# Patient Record
Sex: FEMALE | Race: WHITE | Employment: OTHER | URBAN - METROPOLITAN AREA
[De-identification: names, ages, dates, MRNs, and addresses within clinical notes are randomized per-mention and may not be internally consistent; named-entity substitution may affect disease eponyms.]

---

## 2017-04-01 ENCOUNTER — HOSPITAL ENCOUNTER (EMERGENCY)
Age: 47
Discharge: HOME | End: 2017-04-01
Attending: EMERGENCY MEDICINE | Admitting: EMERGENCY MEDICINE
Payer: COMMERCIAL

## 2017-04-01 ENCOUNTER — APPOINTMENT (OUTPATIENT)
Dept: GENERAL RADIOLOGY | Age: 47
End: 2017-04-01
Attending: EMERGENCY MEDICINE
Payer: COMMERCIAL

## 2017-04-01 VITALS
HEIGHT: 67 IN | WEIGHT: 215 LBS | HEART RATE: 147 BPM | OXYGEN SATURATION: 94 % | DIASTOLIC BLOOD PRESSURE: 72 MMHG | SYSTOLIC BLOOD PRESSURE: 119 MMHG | TEMPERATURE: 98.8 F | BODY MASS INDEX: 33.74 KG/M2 | RESPIRATION RATE: 20 BRPM

## 2017-04-01 DIAGNOSIS — J45.901 RAD (REACTIVE AIRWAY DISEASE), UNSPECIFIED ASTHMA SEVERITY, WITH ACUTE EXACERBATION: Primary | ICD-10-CM

## 2017-04-01 LAB
ANION GAP BLD CALC-SCNC: 11 MMOL/L (ref 5–15)
BASOPHILS # BLD AUTO: 0 K/UL (ref 0–0.1)
BASOPHILS # BLD: 0 % (ref 0–1)
BUN SERPL-MCNC: 10 MG/DL (ref 6–20)
BUN/CREAT SERPL: 13 (ref 12–20)
CALCIUM SERPL-MCNC: 9.1 MG/DL (ref 8.5–10.1)
CHLORIDE SERPL-SCNC: 102 MMOL/L (ref 97–108)
CO2 SERPL-SCNC: 25 MMOL/L (ref 21–32)
CREAT SERPL-MCNC: 0.78 MG/DL (ref 0.55–1.02)
DIFFERENTIAL METHOD BLD: ABNORMAL
EOSINOPHIL # BLD: 0 K/UL (ref 0–0.4)
EOSINOPHIL NFR BLD: 0 % (ref 0–7)
ERYTHROCYTE [DISTWIDTH] IN BLOOD BY AUTOMATED COUNT: 13.2 % (ref 11.5–14.5)
GLUCOSE SERPL-MCNC: 153 MG/DL (ref 65–100)
HCT VFR BLD AUTO: 43 % (ref 35–47)
HGB BLD-MCNC: 14.2 G/DL (ref 11.5–16)
LYMPHOCYTES # BLD AUTO: 6 % (ref 12–49)
LYMPHOCYTES # BLD: 0.6 K/UL (ref 0.8–3.5)
MCH RBC QN AUTO: 28.3 PG (ref 26–34)
MCHC RBC AUTO-ENTMCNC: 33 G/DL (ref 30–36.5)
MCV RBC AUTO: 85.8 FL (ref 80–99)
MONOCYTES # BLD: 0.4 K/UL (ref 0–1)
MONOCYTES NFR BLD AUTO: 4 % (ref 5–13)
NEUTS SEG # BLD: 8.3 K/UL (ref 1.8–8)
NEUTS SEG NFR BLD AUTO: 90 % (ref 32–75)
PLATELET # BLD AUTO: 329 K/UL (ref 150–400)
POTASSIUM SERPL-SCNC: 4 MMOL/L (ref 3.5–5.1)
RBC # BLD AUTO: 5.01 M/UL (ref 3.8–5.2)
RBC MORPH BLD: ABNORMAL
SODIUM SERPL-SCNC: 138 MMOL/L (ref 136–145)
TROPONIN I SERPL-MCNC: <0.04 NG/ML
TROPONIN I SERPL-MCNC: <0.04 NG/ML
WBC # BLD AUTO: 9.3 K/UL (ref 3.6–11)

## 2017-04-01 PROCEDURE — 94640 AIRWAY INHALATION TREATMENT: CPT

## 2017-04-01 PROCEDURE — 74011000250 HC RX REV CODE- 250: Performed by: EMERGENCY MEDICINE

## 2017-04-01 PROCEDURE — 96365 THER/PROPH/DIAG IV INF INIT: CPT

## 2017-04-01 PROCEDURE — 84484 ASSAY OF TROPONIN QUANT: CPT | Performed by: EMERGENCY MEDICINE

## 2017-04-01 PROCEDURE — 77030013140 HC MSK NEB VYRM -A

## 2017-04-01 PROCEDURE — 93005 ELECTROCARDIOGRAM TRACING: CPT

## 2017-04-01 PROCEDURE — 80048 BASIC METABOLIC PNL TOTAL CA: CPT | Performed by: EMERGENCY MEDICINE

## 2017-04-01 PROCEDURE — 36415 COLL VENOUS BLD VENIPUNCTURE: CPT | Performed by: EMERGENCY MEDICINE

## 2017-04-01 PROCEDURE — 85025 COMPLETE CBC W/AUTO DIFF WBC: CPT | Performed by: EMERGENCY MEDICINE

## 2017-04-01 PROCEDURE — 74011250636 HC RX REV CODE- 250/636: Performed by: EMERGENCY MEDICINE

## 2017-04-01 PROCEDURE — 99284 EMERGENCY DEPT VISIT MOD MDM: CPT

## 2017-04-01 PROCEDURE — 71020 XR CHEST PA LAT: CPT

## 2017-04-01 PROCEDURE — 77030018744 HC FLOMTR PEAK FLO -A

## 2017-04-01 RX ORDER — MAGNESIUM SULFATE HEPTAHYDRATE 40 MG/ML
2 INJECTION, SOLUTION INTRAVENOUS ONCE
Status: COMPLETED | OUTPATIENT
Start: 2017-04-01 | End: 2017-04-01

## 2017-04-01 RX ORDER — DOXYCYCLINE 100 MG/1
100 CAPSULE ORAL 2 TIMES DAILY
COMMUNITY

## 2017-04-01 RX ORDER — ALBUTEROL SULFATE 90 UG/1
2 AEROSOL, METERED RESPIRATORY (INHALATION)
Qty: 1 INHALER | Refills: 0 | Status: SHIPPED | OUTPATIENT
Start: 2017-04-01

## 2017-04-01 RX ADMIN — ALBUTEROL SULFATE 1 DOSE: 2.5 SOLUTION RESPIRATORY (INHALATION) at 11:06

## 2017-04-01 RX ADMIN — ALBUTEROL SULFATE 1 DOSE: 2.5 SOLUTION RESPIRATORY (INHALATION) at 11:21

## 2017-04-01 RX ADMIN — ALBUTEROL SULFATE 1 DOSE: 2.5 SOLUTION RESPIRATORY (INHALATION) at 11:36

## 2017-04-01 RX ADMIN — MAGNESIUM SULFATE IN WATER 2 G: 40 INJECTION, SOLUTION INTRAVENOUS at 13:00

## 2017-04-01 NOTE — DISCHARGE INSTRUCTIONS
We hope that we have addressed all of your medical concerns. The examination and treatment you received in the Emergency Department were for an emergent problem and were not intended as complete care. It is important that you follow up with your healthcare provider(s) for ongoing care. If your symptoms worsen or do not improve as expected, and you are unable to reach your usual health care provider(s), you should return to the Emergency Department. Today's healthcare is undergoing tremendous change, and patient satisfaction surveys are one of the many tools to assess the quality of medical care. You may receive a survey from the [employer] regarding your experience in the Emergency Department. I hope that your experience has been completely positive, particularly the medical care that I provided. As such, please participate in the survey; anything less than excellent does not meet my expectations or intentions. [facility] and 8 [facility]3] participate in nationally recognized quality of care measures. If your blood pressure is greater than 120/80, as reported below, we urge that you seek medical care to address the potential of high blood pressure, commonly known as hypertension. Hypertension can be hereditary or can be caused by certain medical conditions, pain, stress, or \"white coat syndrome. \"       Please make an appointment with your health care provider(s) for follow up of your Emergency Department visit.        VITALS:   Patient Vitals for the past 8 hrs:   Temp Pulse Resp BP SpO2   04/01/17 1430 - - - 110/58 93 %   04/01/17 1400 - - - 120/60 95 %   04/01/17 1352 - (!) 147 - - 98 %   04/01/17 1352 - - - 122/68 100 %   04/01/17 1330 - - - 94/53 96 %   04/01/17 1321 - - - 119/76 -   04/01/17 1300 - - - 118/57 95 %   04/01/17 1245 - - - 122/77 98 %   04/01/17 1230 - - - 118/71 99 %   04/01/17 1228 - - - 115/67 96 %   04/01/17 1200 - - - 132/77 98 %   04/01/17 1146 - - - 146/84 99 %   04/01/17 1059 - - - - 96 %   04/01/17 1047 98.8 °F (37.1 °C) (!) 122 20 140/82 96 %          Thank you for allowing us to provide you with medical care today. We realize that you have many choices for your emergency care needs. Please choose us in the future for any continued health care needs. Regards,           Elisabeth Dai DO    [facility] [address] [place] [address].   Office: [phone]            Recent Results (from the past 24 hour(s))   EKG, 12 LEAD, INITIAL    Collection Time: 04/01/17 10:55 AM   Result Value Ref Range    Ventricular Rate 119 BPM    Atrial Rate 119 BPM    P-R Interval 150 ms    QRS Duration 86 ms    Q-T Interval 310 ms    QTC Calculation (Bezet) 436 ms    Calculated P Axis 53 degrees    Calculated R Axis 10 degrees    Calculated T Axis 91 degrees    Diagnosis       Sinus tachycardia  Nonspecific T wave abnormality  Abnormal ECG  No previous ECGs available     CBC WITH AUTOMATED DIFF    Collection Time: 04/01/17 11:16 AM   Result Value Ref Range    WBC 9.3 3.6 - 11.0 K/uL    RBC 5.01 3.80 - 5.20 M/uL    HGB 14.2 11.5 - 16.0 g/dL    HCT 43.0 35.0 - 47.0 %    MCV 85.8 80.0 - 99.0 FL    MCH 28.3 26.0 - 34.0 PG    MCHC 33.0 30.0 - 36.5 g/dL    RDW 13.2 11.5 - 14.5 %    PLATELET 722 278 - 749 K/uL    NEUTROPHILS 90 (H) 32 - 75 %    LYMPHOCYTES 6 (L) 12 - 49 %    MONOCYTES 4 (L) 5 - 13 %    EOSINOPHILS 0 0 - 7 %    BASOPHILS 0 0 - 1 %    ABS. NEUTROPHILS 8.3 (H) 1.8 - 8.0 K/UL    ABS. LYMPHOCYTES 0.6 (L) 0.8 - 3.5 K/UL    ABS. MONOCYTES 0.4 0.0 - 1.0 K/UL    ABS. EOSINOPHILS 0.0 0.0 - 0.4 K/UL    ABS.  BASOPHILS 0.0 0.0 - 0.1 K/UL    DF SMEAR SCANNED      RBC COMMENTS NORMOCYTIC, NORMOCHROMIC     METABOLIC PANEL, BASIC    Collection Time: 04/01/17 11:16 AM   Result Value Ref Range    Sodium 138 136 - 145 mmol/L    Potassium 4.0 3.5 - 5.1 mmol/L    Chloride 102 97 - 108 mmol/L    CO2 25 21 - 32 mmol/L    Anion gap 11 5 - 15 mmol/L    Glucose 153 (H) 65 - 100 mg/dL    BUN 10 6 - 20 MG/DL    Creatinine 0.78 0.55 - 1.02 MG/DL    BUN/Creatinine ratio 13 12 - 20      GFR est AA >60 >60 ml/min/1.73m2    GFR est non-AA >60 >60 ml/min/1.73m2    Calcium 9.1 8.5 - 10.1 MG/DL   TROPONIN I    Collection Time: 04/01/17 11:16 AM   Result Value Ref Range    Troponin-I, Qt. <0.04 <0.05 ng/mL       Xr Chest Pa Lat    Result Date: 4/1/2017  INDICATION: . cough, Additional history: Bronchitis diagnosed 2 days previously. Patient placed on inhaler, prednisone and doxycycline. Increasing symptoms. COMPARISON: None. Lesvia Line FINDINGS: PA and lateral view of the chest. . Lines/tubes/surgical: None. Heart/mediastinum: Unremarkable. Lungs/pleura:  No focal consolidation or mass. No visualized pleural effusion or pneumothorax. Additional Comments: None. Lesvia Line IMPRESSION: 1. No radiographic evidence of acute cardiopulmonary disease. Reactive Airway Disease: Care Instructions  Your Care Instructions  Reactive airway disease is a breathing problem that appears as wheezing, a whistling noise in your airways. It may be caused by a viral or bacterial infection, allergies, tobacco smoke, or something else in the environment. When you are around these triggers, your body releases chemicals that make the airways get tight. Reactive airway disease is a lot like asthma. Both can cause wheezing. But asthma is ongoing, while reactive airway disease may occur only now and then. Tests can be done to tell whether you have asthma. You may take the same medicines used to treat asthma. Good home care and follow-up care with your doctor can help you recover. Follow-up care is a key part of your treatment and safety. Be sure to make and go to all appointments, and call your doctor if you are having problems. It's also a good idea to know your test results and keep a list of the medicines you take. How can you care for yourself at home? · Take your medicines exactly as prescribed.  Call your doctor if you think you are having a problem with your medicine. · Do not smoke or allow others to smoke around you. If you need help quitting, talk to your doctor about stop-smoking programs and medicines. These can increase your chances of quitting for good. · If you know what caused your wheezing (such as perfume or the odor of household chemicals), try to avoid it in the future. · Wash your hands several times a day, and consider using hand gels or wipes that contain alcohol. This can prevent colds and other infections. When should you call for help? Call 911 anytime you think you may need emergency care. For example, call if:  · You have severe trouble breathing. Watch closely for changes in your health, and be sure to contact your doctor if:  · You cough up yellow, dark brown, or bloody mucus. · You have a fever. · Your wheezing gets worse. Where can you learn more? Go to http://marce-latha.info/. Enter L039 in the search box to learn more about \"Reactive Airway Disease: Care Instructions. \"  Current as of: May 23, 2016  Content Version: 11.2  © 0913-8733 [employer]. Care instructions adapted under license by [employer] (which disclaims liability or warranty for this information). If you have questions about a medical condition or this instruction, always ask your healthcare professional. [address] any warranty or liability for your use of this information.

## 2017-04-01 NOTE — ED TRIAGE NOTES
Pt states she was seen at [facility] on Thursday for bronchitis and was placed on inhaler, Prednisone, and Doxy. Pt reports it has moved into her chest rapidly.

## 2017-04-01 NOTE — ED NOTES
Patient ambulated around nurses station without complaints of dizziness or shortness of breath. Pulse up to 147, saturations on room air %.

## 2017-04-01 NOTE — ED PROVIDER NOTES
HPI Comments: 51 y.o. female with past medical history significant for HTN, elevated lipids, pre-diabetes, temporal lobe epilepsy, complicated migraine, vertigo, AI disease, recurrent sinusitis, bronchitis, and PNA who presents from home with chief complaint of CP. Pt reports to the ED c/o CP with accompanying tightness that began within the past week with accompanying cough, fever, diaphoresis, and dizziness. Pt states that she has experienced a similar CP/tightness in the past, but that it was not as severe. Pt was seen at [facility] 2 days ago for a sinus infection, that she states is chronic, and she was prescribed a Prednisone taper, BID Doxycycline, and an inhaler. Pt states that the Prednisone did not seem to improve her symptoms, but the steam from a shower did seem to help. Pt reports that she also irrigates her sinuses regularly. Pt notes that her cough is productive of yellowish green sputum. Pt notes that she has Hx of seizure-like activity, for which she takes Topamax. Pt reports that she has an aura of \"vikas vu\" that usually precedes her episodes of seizure-like activity, and she experienced the aura this morning. Pt has taken the first 2 days of Prednisone, most recently this morning, and the first 3 doses of Doxycycline, most recently last night. Pt has been taking 2 puffs from her inhaler TID, most recently at 0800 this morning. Pt states that the inhaler normally helps to relieve her symptoms, but it did not seem to help her this morning. Pt currently has an appointment scheduled at [facility]. Pt denies Hx of eczema or asthma and denies any chance of pregnancy. Pt denies experiencing any loss of appetite or hemoptysis. There are no other acute medical concerns at this time. Social hx: No smoke; Works as Mental Health Therapist.  Significant FMHx: One daughter has exercise-induced asthma and hypermobility.   PCP: Diana See MD    OLD CHART REVIEW: Pt was seen by  Franklin Rodriguez on 10/7/2016 for a checkup and to go over BP. Note written by Jazmin Park. [place], as dictated by Fran Rodriguez, DO 10:57 AM      The history is provided by the patient. Past Medical History:   Diagnosis Date    Bronchitis     Complicated migraine     Elevated lipids     HTN (hypertension)     Pneumonia     Pre-diabetes     Recurrent sinusitis     Temporal lobe epilepsy (HCC)     Vertigo        Past Surgical History:   Procedure Laterality Date    HX OTHER SURGICAL      sinoplasty 2004 & 2010         History reviewed. No pertinent family history. Social History     Social History    Marital status:      Spouse name: N/A    Number of children: N/A    Years of education: N/A     Occupational History    Not on file. Social History Main Topics    Smoking status: Never Smoker    Smokeless tobacco: Not on file    Alcohol use Yes      Comment: 2 glasses wine QD.  Drug use: No    Sexual activity: Not on file     Other Topics Concern    Not on file     Social History Narrative         ALLERGIES: Review of patient's allergies indicates no known allergies. Review of Systems   Constitutional: Positive for diaphoresis and fever. Negative for appetite change (Loss). Respiratory: Positive for cough and chest tightness. No hemoptysis   Cardiovascular: Positive for chest pain. Neurological: Positive for dizziness. All other systems reviewed and are negative. Vitals:    04/01/17 1047 04/01/17 1059 04/01/17 1146   BP: 140/82  146/84   Pulse: (!) 122     Resp: 20     Temp: 98.8 °F (37.1 °C)     SpO2: 96% 96% 99%   Weight: 97.5 kg (215 lb)     Height: 5' 6.5\" (1.689 m)              Physical Exam      Constitutional: Pt is awake and alert. Pt appears well-developed and well-nourished. NAD. HENT:   Head: Normocephalic and atraumatic. Nose: Nose normal.   Mouth/Throat: Oropharynx is clear and moist. No oropharyngeal exudate.    Eyes: Conjunctivae and extraocular motions are normal. Pupils are equal, round, and reactive to light. Right eye exhibits no discharge. Left eye exhibits no discharge. No scleral icterus. Neck: No tracheal deviation present. Supple neck. Cardiovascular: Regular rhythm, normal heart sounds and intact distal pulses. Exam reveals no gallop and no friction rub. No murmur heard. Tachycardic. Pulmonary/Chest: Diminished lung sounds. No crackles or wheezes. Coughs frequently with deep breaths. Abdominal: Soft. Pt  exhibits no distension and no mass. No tenderness. Pt  has no rebound and no guarding. Musculoskeletal:  Pt  exhibits no edema and no tenderness. Ext: Normal ROM in all four extremities; not tender to palpation; distal pulses are normal, no edema. Neurological:  Pt is alert. nonfocal neuro exam.  Skin: Skin is warm and dry. Pt  is not diaphoretic. Psychiatric:  Pt  has a normal mood and affect. Behavior is normal.   Note written by Toni Stock Null, as dictated by Giovany Hussein,  10:57 AM            [facility]  ED Course       Procedures    ED EKG interpretation:  Rhythm: sinus tachycardia; Rate (approx.): 119 bpm; Axis: normal; Nonspecific T wave abnormality. Note written by Toni Zarate. Stock Null, as dictated by Giovany Hussein, DO 10:55 AM      PROGRESS NOTE:  11:34 PM  Dr. Billie Molina reviewed pt's CXR. PROGRESS NOTE:  1:04 PM  Pt's peak flow prior to nebulizer treatments was 350, 400 after the treatments, and the predicted is 480. Pt's lungs are clear and she is moving better air. She is tachycardic and jittery from albuterol. Dr. Billie Molina discussed results with her, does not think she needs a chest CT, and doubts that she has a PE. Will give Magnesium infusion in case this is an asthma exacerbation. Seemed also better after mag infusion.             Labs Reviewed   CBC WITH AUTOMATED DIFF - Abnormal; Notable for the following:        Result Value    NEUTROPHILS 90 (*)     LYMPHOCYTES 6 (*) MONOCYTES 4 (*)     ABS. NEUTROPHILS 8.3 (*)     ABS.  LYMPHOCYTES 0.6 (*)     All other components within normal limits   METABOLIC PANEL, BASIC - Abnormal; Notable for the following:     Glucose 153 (*)     All other components within normal limits   SAMPLES BEING HELD   TROPONIN I   TROPONIN I

## 2017-04-02 LAB
ATRIAL RATE: 119 BPM
CALCULATED P AXIS, ECG09: 53 DEGREES
CALCULATED R AXIS, ECG10: 10 DEGREES
CALCULATED T AXIS, ECG11: 91 DEGREES
DIAGNOSIS, 93000: NORMAL
P-R INTERVAL, ECG05: 150 MS
Q-T INTERVAL, ECG07: 310 MS
QRS DURATION, ECG06: 86 MS
QTC CALCULATION (BEZET), ECG08: 436 MS
VENTRICULAR RATE, ECG03: 119 BPM

## 2017-04-03 ENCOUNTER — APPOINTMENT (OUTPATIENT)
Dept: GENERAL RADIOLOGY | Age: 47
End: 2017-04-03
Attending: EMERGENCY MEDICINE
Payer: COMMERCIAL

## 2017-04-03 ENCOUNTER — HOSPITAL ENCOUNTER (EMERGENCY)
Age: 47
Discharge: HOME | End: 2017-04-03
Attending: EMERGENCY MEDICINE
Payer: COMMERCIAL

## 2017-04-03 VITALS
WEIGHT: 215 LBS | TEMPERATURE: 98.6 F | HEIGHT: 67 IN | SYSTOLIC BLOOD PRESSURE: 144 MMHG | OXYGEN SATURATION: 97 % | RESPIRATION RATE: 20 BRPM | DIASTOLIC BLOOD PRESSURE: 92 MMHG | HEART RATE: 114 BPM | BODY MASS INDEX: 33.74 KG/M2

## 2017-04-03 DIAGNOSIS — B34.9 VIRAL SYNDROME: Primary | ICD-10-CM

## 2017-04-03 LAB
ALBUMIN SERPL BCP-MCNC: 4.2 G/DL (ref 3.5–5)
ALBUMIN/GLOB SERPL: 1.1 {RATIO} (ref 1.1–2.2)
ALP SERPL-CCNC: 98 U/L (ref 45–117)
ALT SERPL-CCNC: 36 U/L (ref 12–78)
ANION GAP BLD CALC-SCNC: 12 MMOL/L (ref 5–15)
AST SERPL W P-5'-P-CCNC: 16 U/L (ref 15–37)
BASOPHILS # BLD AUTO: 0 K/UL (ref 0–0.1)
BASOPHILS # BLD: 0 % (ref 0–1)
BILIRUB SERPL-MCNC: 0.2 MG/DL (ref 0.2–1)
BUN SERPL-MCNC: 12 MG/DL (ref 6–20)
BUN/CREAT SERPL: 13 (ref 12–20)
CALCIUM SERPL-MCNC: 9.2 MG/DL (ref 8.5–10.1)
CHLORIDE SERPL-SCNC: 101 MMOL/L (ref 97–108)
CO2 SERPL-SCNC: 28 MMOL/L (ref 21–32)
CREAT SERPL-MCNC: 0.9 MG/DL (ref 0.55–1.02)
EOSINOPHIL # BLD: 0 K/UL (ref 0–0.4)
EOSINOPHIL NFR BLD: 0 % (ref 0–7)
ERYTHROCYTE [DISTWIDTH] IN BLOOD BY AUTOMATED COUNT: 13.4 % (ref 11.5–14.5)
FLUAV AG NPH QL IA: NEGATIVE
FLUBV AG NOSE QL IA: NEGATIVE
GLOBULIN SER CALC-MCNC: 3.7 G/DL (ref 2–4)
GLUCOSE SERPL-MCNC: 103 MG/DL (ref 65–100)
HCT VFR BLD AUTO: 45.3 % (ref 35–47)
HGB BLD-MCNC: 14.6 G/DL (ref 11.5–16)
LACTATE SERPL-SCNC: 1.8 MMOL/L (ref 0.4–2)
LYMPHOCYTES # BLD AUTO: 17 % (ref 12–49)
LYMPHOCYTES # BLD: 1.3 K/UL (ref 0.8–3.5)
MCH RBC QN AUTO: 28.5 PG (ref 26–34)
MCHC RBC AUTO-ENTMCNC: 32.2 G/DL (ref 30–36.5)
MCV RBC AUTO: 88.5 FL (ref 80–99)
MONOCYTES # BLD: 0.6 K/UL (ref 0–1)
MONOCYTES NFR BLD AUTO: 7 % (ref 5–13)
NEUTS SEG # BLD: 5.8 K/UL (ref 1.8–8)
NEUTS SEG NFR BLD AUTO: 76 % (ref 32–75)
PLATELET # BLD AUTO: 371 K/UL (ref 150–400)
POTASSIUM SERPL-SCNC: 3.9 MMOL/L (ref 3.5–5.1)
PROT SERPL-MCNC: 7.9 G/DL (ref 6.4–8.2)
RBC # BLD AUTO: 5.12 M/UL (ref 3.8–5.2)
SODIUM SERPL-SCNC: 141 MMOL/L (ref 136–145)
WBC # BLD AUTO: 7.7 K/UL (ref 3.6–11)

## 2017-04-03 PROCEDURE — 96360 HYDRATION IV INFUSION INIT: CPT

## 2017-04-03 PROCEDURE — 94640 AIRWAY INHALATION TREATMENT: CPT

## 2017-04-03 PROCEDURE — 80053 COMPREHEN METABOLIC PANEL: CPT | Performed by: EMERGENCY MEDICINE

## 2017-04-03 PROCEDURE — 71020 XR CHEST PA LAT: CPT

## 2017-04-03 PROCEDURE — 99282 EMERGENCY DEPT VISIT SF MDM: CPT

## 2017-04-03 PROCEDURE — 87804 INFLUENZA ASSAY W/OPTIC: CPT | Performed by: EMERGENCY MEDICINE

## 2017-04-03 PROCEDURE — 36415 COLL VENOUS BLD VENIPUNCTURE: CPT | Performed by: EMERGENCY MEDICINE

## 2017-04-03 PROCEDURE — 74011000250 HC RX REV CODE- 250: Performed by: EMERGENCY MEDICINE

## 2017-04-03 PROCEDURE — 77030013140 HC MSK NEB VYRM -A

## 2017-04-03 PROCEDURE — 83605 ASSAY OF LACTIC ACID: CPT | Performed by: EMERGENCY MEDICINE

## 2017-04-03 PROCEDURE — 85025 COMPLETE CBC W/AUTO DIFF WBC: CPT | Performed by: EMERGENCY MEDICINE

## 2017-04-03 PROCEDURE — 74011250636 HC RX REV CODE- 250/636: Performed by: EMERGENCY MEDICINE

## 2017-04-03 RX ORDER — BENZONATATE 100 MG/1
100 CAPSULE ORAL
Qty: 30 CAP | Refills: 0 | Status: SHIPPED | OUTPATIENT
Start: 2017-04-03 | End: 2017-04-10

## 2017-04-03 RX ORDER — AZITHROMYCIN 250 MG/1
TABLET, FILM COATED ORAL
Qty: 6 TAB | Refills: 0 | Status: SHIPPED | OUTPATIENT
Start: 2017-04-03 | End: 2017-04-08

## 2017-04-03 RX ADMIN — SODIUM CHLORIDE 2925 ML: 900 INJECTION, SOLUTION INTRAVENOUS at 14:23

## 2017-04-03 RX ADMIN — ALBUTEROL SULFATE 1 DOSE: 2.5 SOLUTION RESPIRATORY (INHALATION) at 14:23

## 2017-04-03 NOTE — DISCHARGE INSTRUCTIONS
Viral Infections: Care Instructions  Your Care Instructions  You don't feel well, but it's not clear what's causing it. You may have a viral infection. Viruses cause many illnesses, such as the common cold, influenza, fever, rashes, and the diarrhea, nausea, and vomiting that are often called \"stomach flu. \" You may wonder if antibiotic medicines could make you feel better. But antibiotics only treat infections caused by bacteria. They don't work on viruses. The good news is that viral infections usually aren't serious. Most will go away in a few days without medical treatment. In the meantime, there are a few things you can do to make yourself more comfortable. Follow-up care is a key part of your treatment and safety. Be sure to make and go to all appointments, and call your doctor if you are having problems. It's also a good idea to know your test results and keep a list of the medicines you take. How can you care for yourself at home? · Get plenty of rest if you feel tired. · Take an over-the-counter pain medicine if needed, such as acetaminophen (Tylenol), ibuprofen (Advil, Motrin), or naproxen (Aleve). Read and follow all instructions on the label. · Be careful when taking over-the-counter cold or flu medicines and Tylenol at the same time. Many of these medicines have acetaminophen, which is Tylenol. Read the labels to make sure that you are not taking more than the recommended dose. Too much acetaminophen (Tylenol) can be harmful. · Drink plenty of fluids, enough so that your urine is light yellow or clear like water. If you have kidney, heart, or liver disease and have to limit fluids, talk with your doctor before you increase the amount of fluids you drink. · Stay home from work, school, and other public places while you have a fever. When should you call for help? Call 911 anytime you think you may need emergency care. For example, call if:  · You have severe trouble breathing.   · You passed out (lost consciousness). Call your doctor now or seek immediate medical care if:  · You seem to be getting much sicker. · You have a new or higher fever. · You have blood in your stools. · You have new belly pain, or your pain gets worse. · You have a new rash. Watch closely for changes in your health, and be sure to contact your doctor if:  · You start to get better and then get worse. · You do not get better as expected. Where can you learn more? Go to http://marce-latha.info/. Enter C331 in the search box to learn more about \"Viral Infections: Care Instructions. \"  Current as of: May 24, 2016  Content Version: 11.2  © 1280-4860 [employer]. Care instructions adapted under license by [employer] (which disclaims liability or warranty for this information). If you have questions about a medical condition or this instruction, always ask your healthcare professional. [place] [address] any warranty or liability for your use of this information. We hope that we have addressed all of your medical concerns. The examination and treatment you received in the Emergency Department were for an emergent problem and were not intended as complete care. It is important that you follow up with your healthcare provider(s) for ongoing care. If your symptoms worsen or do not improve as expected, and you are unable to reach your usual health care provider(s), you should return to the Emergency Department. Today's healthcare is undergoing tremendous change, and patient satisfaction surveys are one of the many tools to assess the quality of medical care. You may receive a survey from the [employer] organization regarding your experience in the Emergency Department. I hope that your experience has been completely positive, particularly the medical care that I provided.   As such, please participate in the survey; anything less than excellent does not meet my expectations or intentions. [facility] and [facility] participate in nationally recognized quality of care measures. If your blood pressure is greater than 120/80, as reported below, we urge that you seek medical care to address the potential of high blood pressure, commonly known as hypertension. Hypertension can be hereditary or can be caused by certain medical conditions, pain, stress, or \"white coat syndrome. \"       Please make an appointment with your health care provider(s) for follow up of your Emergency Department visit. VITALS:   Patient Vitals for the past 8 hrs:   Temp Pulse Resp BP SpO2   04/03/17 1326 98.6 °F (37 °C) (!) 114 20 (!) 144/92 97 %          Thank you for allowing us to provide you with medical care today. We realize that you have many choices for your emergency care needs. Please choose us in the future for any continued health care needs. Jennifer Amorcolton Nuno, [address] [place] [address] 20.   Office: [phone]            Recent Results (from the past 24 hour(s))   CBC WITH AUTOMATED DIFF    Collection Time: 04/03/17  1:56 PM   Result Value Ref Range    WBC 7.7 3.6 - 11.0 K/uL    RBC 5.12 3.80 - 5.20 M/uL    HGB 14.6 11.5 - 16.0 g/dL    HCT 45.3 35.0 - 47.0 %    MCV 88.5 80.0 - 99.0 FL    MCH 28.5 26.0 - 34.0 PG    MCHC 32.2 30.0 - 36.5 g/dL    RDW 13.4 11.5 - 14.5 %    PLATELET 953 331 - 164 K/uL    NEUTROPHILS 76 (H) 32 - 75 %    LYMPHOCYTES 17 12 - 49 %    MONOCYTES 7 5 - 13 %    EOSINOPHILS 0 0 - 7 %    BASOPHILS 0 0 - 1 %    ABS. NEUTROPHILS 5.8 1.8 - 8.0 K/UL    ABS. LYMPHOCYTES 1.3 0.8 - 3.5 K/UL    ABS. MONOCYTES 0.6 0.0 - 1.0 K/UL    ABS. EOSINOPHILS 0.0 0.0 - 0.4 K/UL    ABS.  BASOPHILS 0.0 0.0 - 0.1 K/UL   METABOLIC PANEL, COMPREHENSIVE    Collection Time: 04/03/17  1:56 PM   Result Value Ref Range    Sodium 141 136 - 145 mmol/L    Potassium 3.9 3.5 - 5.1 mmol/L    Chloride 101 97 - 108 mmol/L    CO2 28 21 - 32 mmol/L    Anion gap 12 5 - 15 mmol/L    Glucose 103 (H) 65 - 100 mg/dL    BUN 12 6 - 20 MG/DL    Creatinine 0.90 0.55 - 1.02 MG/DL    BUN/Creatinine ratio 13 12 - 20      GFR est AA >60 >60 ml/min/1.73m2    GFR est non-AA >60 >60 ml/min/1.73m2    Calcium 9.2 8.5 - 10.1 MG/DL    Bilirubin, total 0.2 0.2 - 1.0 MG/DL    ALT (SGPT) 36 12 - 78 U/L    AST (SGOT) 16 15 - 37 U/L    Alk. phosphatase 98 45 - 117 U/L    Protein, total 7.9 6.4 - 8.2 g/dL    Albumin 4.2 3.5 - 5.0 g/dL    Globulin 3.7 2.0 - 4.0 g/dL    A-G Ratio 1.1 1.1 - 2.2     INFLUENZA A & B AG (RAPID TEST)    Collection Time: 04/03/17  1:56 PM   Result Value Ref Range    Influenza A Antigen NEGATIVE  NEG      Influenza B Antigen NEGATIVE  NEG     LACTIC ACID, PLASMA    Collection Time: 04/03/17  1:56 PM   Result Value Ref Range    Lactic acid 1.8 0.4 - 2.0 MMOL/L       Xr Chest Pa Lat    Result Date: 4/3/2017  Exam:  2 view chest Indication: Cough Comparison to 4/1/2017. PA and lateral views demonstrate normal heart size. There is no acute process in the lung fields. The osseous structures are unremarkable.      Impression: No acute process or change compared to the prior exam.

## 2017-04-03 NOTE — ED PROVIDER NOTES
HPI Comments: 51 y.o. female with past medical history significant for PNA, recurrent sinusitis, bronchitis, vertigo, HTN, pre-diabetes, and temporal lobe epilepsy who presents with chief complaint of cough. 6 days ago, patient began coming down with a \"sinus infection,\" with symptoms including cough, wheezing, sore throat, and night sweats -- \"I get sinus infections all the time, so I know when it's coming on. On top of that, it usually goes straight to my lungs. \"  She was seen at [facility] later that day and placed on Doxycycline and 12-day steroid taper. Patient continued to worsen, and was later seen here in the ED on 4/01/17. XR showed no acute cardiopulmonary disease. Patient improved after neb tx and Magnesium infusion. She was offered admission, but instead opted for outpatient management. Since then, patient has developed worsening symptoms, which prompted today's ED visit. Patient reports passing some loose stools. Patient denies any fever, rash, leg swelling, or urinary symptoms. There are no other acute medical concerns at this time. Social hx: no known ill contacts; +  PCP: Lois Tay MD    Note written by Amadeo Hong, as dictated by Mariel Caro. Belkis Henson MD 1:55 PM    The history is provided by the patient. Past Medical History:   Diagnosis Date    Bronchitis     Complicated migraine     Elevated lipids     HTN (hypertension)     Pneumonia     Pre-diabetes     Recurrent sinusitis     Temporal lobe epilepsy (HCC)     Vertigo        Past Surgical History:   Procedure Laterality Date    HX OTHER SURGICAL      sinoplasty 2004 & 2010         No family history on file. Social History     Social History    Marital status:      Spouse name: N/A    Number of children: N/A    Years of education: N/A     Occupational History    Not on file.      Social History Main Topics    Smoking status: Never Smoker    Smokeless tobacco: Not on file    Alcohol use Yes      Comment: 2 glasses wine QD.  Drug use: No    Sexual activity: Not on file     Other Topics Concern    Not on file     Social History Narrative         ALLERGIES: Review of patient's allergies indicates no known allergies. Review of Systems   Constitutional: Positive for diaphoresis. Negative for chills and fever. HENT: Positive for sore throat. Negative for ear pain. Eyes: Negative for pain. Respiratory: Positive for cough and wheezing. Negative for chest tightness. Cardiovascular: Negative for chest pain and leg swelling. Gastrointestinal: Positive for nausea. Negative for abdominal pain and vomiting. Genitourinary: Negative for dysuria and flank pain. Musculoskeletal: Negative for back pain. Skin: Negative for rash. Neurological: Negative for headaches. All other systems reviewed and are negative. Vitals:    04/03/17 1326   BP: (!) 144/92   Pulse: (!) 114   Resp: 20   Temp: 98.6 °F (37 °C)   SpO2: 97%   Weight: 97.5 kg (215 lb)   Height: 5' 6.5\" (1.689 m)            Physical Exam   Constitutional: She appears well-developed and well-nourished. Appears diaphoretic and uncomfortable. HENT:   Head: Normocephalic and atraumatic. Mouth/Throat: Oropharynx is clear and moist.   Eyes: Conjunctivae and EOM are normal. Pupils are equal, round, and reactive to light. No scleral icterus. Neck: Neck supple. No tracheal deviation present. Cardiovascular: Normal rate, regular rhythm and normal heart sounds. Pulmonary/Chest: Effort normal. No respiratory distress. She has wheezes. She has rales. Wheezing and rales, greatest in right lower lung fields. Abdominal: Soft. She exhibits no distension. There is no tenderness. There is no rebound and no guarding. Genitourinary:   Genitourinary Comments: deferred   Musculoskeletal: She exhibits no edema. Neurological: She is alert. Skin: Skin is warm and dry. Psychiatric: She has a normal mood and affect. Nursing note and vitals reviewed. Note written by Amadeo Briceño, as dictated by Ayan Sandoval.  Angela Rodriguez MD 1:55 PM    MDM  Number of Diagnoses or Management Options  Viral syndrome:   Diagnosis management comments: 51year old presents with cough  Diff dx: pneumonia, viral syndrome, reactive airway  Flu -, CXR unremarkable, normal WBC and lactic acid    A/P: Viral syndrome- currently on antibiotics, tessalon, PCP f/u    Patient Progress  Patient progress: stable    ED Course       Procedures